# Patient Record
Sex: MALE | Race: WHITE | HISPANIC OR LATINO
[De-identification: names, ages, dates, MRNs, and addresses within clinical notes are randomized per-mention and may not be internally consistent; named-entity substitution may affect disease eponyms.]

---

## 2023-08-21 ENCOUNTER — APPOINTMENT (OUTPATIENT)
Dept: CARDIOLOGY | Facility: CLINIC | Age: 53
End: 2023-08-21
Payer: MEDICAID

## 2023-08-21 VITALS — DIASTOLIC BLOOD PRESSURE: 70 MMHG | SYSTOLIC BLOOD PRESSURE: 130 MMHG

## 2023-08-21 VITALS — WEIGHT: 255 LBS | BODY MASS INDEX: 33.8 KG/M2 | HEIGHT: 73 IN

## 2023-08-21 PROCEDURE — 99204 OFFICE O/P NEW MOD 45 MIN: CPT

## 2023-08-21 PROCEDURE — 93000 ELECTROCARDIOGRAM COMPLETE: CPT

## 2023-08-21 RX ORDER — AMLODIPINE AND VALSARTAN 10; 320 MG/1; MG/1
10-320 TABLET, FILM COATED ORAL
Refills: 0 | Status: ACTIVE | COMMUNITY

## 2023-08-21 NOTE — DISCUSSION/SUMMARY
[FreeTextEntry1] : pt with risk factors and abn ekg  ? angina  start ranexa 500 mg po q12  get 2 d echo  get CTA  start metoprolol er 25  start baby aspirin  start fenofibrate 145mg f/u dm with pmd.  [EKG obtained to assist in diagnosis and management of assessed problem(s)] : EKG obtained to assist in diagnosis and management of assessed problem(s)

## 2023-08-21 NOTE — HISTORY OF PRESENT ILLNESS
[FreeTextEntry1] : Patient with HTN, HLD, DM on diet control sent for cv evaluation for uncontrolled htn. pt says bp always high but finally under control and sent for cv evaluation.  pt says not on meds for cholesterol.  pt says chest pressure at times last episode lasted 5 minutes with sob, denies diaphoresis and radiation. pt complaints of sob at times intermittent.   T hdl 44 ldl 106 ALT: 81 AST: 39 A1c: 6.6  pt says not diabetic

## 2023-09-01 ENCOUNTER — APPOINTMENT (OUTPATIENT)
Dept: CARDIOLOGY | Facility: CLINIC | Age: 53
End: 2023-09-01
Payer: MEDICAID

## 2023-09-01 PROCEDURE — 93306 TTE W/DOPPLER COMPLETE: CPT

## 2023-11-28 ENCOUNTER — APPOINTMENT (OUTPATIENT)
Dept: CARDIOLOGY | Facility: CLINIC | Age: 53
End: 2023-11-28
Payer: MEDICAID

## 2023-11-28 VITALS — DIASTOLIC BLOOD PRESSURE: 70 MMHG | SYSTOLIC BLOOD PRESSURE: 140 MMHG | HEART RATE: 74 BPM

## 2023-11-28 VITALS — WEIGHT: 260 LBS | HEIGHT: 73 IN | BODY MASS INDEX: 34.46 KG/M2

## 2023-11-28 PROCEDURE — 99214 OFFICE O/P EST MOD 30 MIN: CPT

## 2024-01-05 ENCOUNTER — RX RENEWAL (OUTPATIENT)
Age: 54
End: 2024-01-05

## 2024-01-05 RX ORDER — RANOLAZINE 500 MG/1
500 TABLET, EXTENDED RELEASE ORAL
Qty: 180 | Refills: 3 | Status: ACTIVE | COMMUNITY
Start: 2023-08-21 | End: 1900-01-01

## 2024-03-28 ENCOUNTER — TRANSCRIPTION ENCOUNTER (OUTPATIENT)
Age: 54
End: 2024-03-28

## 2024-03-28 ENCOUNTER — APPOINTMENT (OUTPATIENT)
Dept: CARDIOLOGY | Facility: CLINIC | Age: 54
End: 2024-03-28
Payer: MEDICAID

## 2024-03-28 VITALS — BODY MASS INDEX: 34.85 KG/M2 | WEIGHT: 263 LBS | HEIGHT: 73 IN

## 2024-03-28 VITALS — SYSTOLIC BLOOD PRESSURE: 158 MMHG | HEART RATE: 74 BPM | DIASTOLIC BLOOD PRESSURE: 110 MMHG

## 2024-03-28 DIAGNOSIS — Z00.00 ENCOUNTER FOR GENERAL ADULT MEDICAL EXAMINATION W/OUT ABNORMAL FINDINGS: ICD-10-CM

## 2024-03-28 PROCEDURE — 99214 OFFICE O/P EST MOD 30 MIN: CPT

## 2024-03-28 RX ORDER — FENOFIBRATE 145 MG/1
145 TABLET, COATED ORAL DAILY
Qty: 90 | Refills: 3 | Status: ACTIVE | COMMUNITY
Start: 2023-08-21 | End: 1900-01-01

## 2024-03-28 RX ORDER — METOPROLOL TARTRATE 100 MG/1
100 TABLET, FILM COATED ORAL
Qty: 2 | Refills: 0 | Status: DISCONTINUED | COMMUNITY
Start: 2023-08-21 | End: 2024-03-28

## 2024-04-26 NOTE — HISTORY OF PRESENT ILLNESS
[FreeTextEntry1] : Patient with HTN, HLD, DM on diet control with h/o uncontrolled htn. BORDERLINE LVH, LAE  Angina   pt says bp always high but finally under control    pt says not on meds for cholesterol.  pt says chest pressure at times last episode lasted 5 minutes with sob, denies diaphoresis and radiation. pt complaints of sob at times intermittent.   T hdl 44 ldl 106 ALT: 81 AST: 39 A1c: 6.6  pt does not feel he is diabetic but explained bloodwork results   23: 23: EF: 53%, DD1, borderline LAE, borderline LVH, CTA ordered for chest pressure but not done.  pt started on ranolazine and fenofibrate last visit.  pt says symptoms of sob improved but still intermittent.   3/28/24:  23: Ranolazine denied by insurance. Letter of medical necessity sent. Patient given resources regarding [email or web address] and [employer].  3/24: bun/cr: stable, A1c: 6.7  TC: 209  T  Hdl 49   pt says chest pressure improved on ranolazine 500 mg po q12 and says able to get meds, not sure if didnt get says wife takes care of meds, pt says had chest pressure one and half week ago and gone.  pt not taking fenofibrate but was on in past.   24: Patient here for BP check with NP after starting Aldactone last visit.  24: AST/ALT: 41/80, T, HDL: 51, LDL: 117, A1C: 7.3, BNP: <36

## 2024-04-26 NOTE — DISCUSSION/SUMMARY
[FreeTextEntry1] : pt with risk factors and abn ekg ? angina  continue ranexa 500 mg po q12 if able to get if not will send isosorbide  continue metoprolol er 25  continue aspirin 81 mg dialy  RESTART fenofibrate 145mg f/u dm with pmd.  TG elevated start fenofibrate continue diet control and f/u pmd may need metformin get bloodwork  not sure why not getting CTA some angina. get exercise stress nuclear  f/u in 4 months  bp elevated at 150/110 start Aldactone 50 mg po qd   5/1/24: Patient has f/u with Dr. Herman in July.   Get repeat blood work before next visit.

## 2024-05-01 ENCOUNTER — APPOINTMENT (OUTPATIENT)
Dept: CARDIOLOGY | Facility: CLINIC | Age: 54
End: 2024-05-01
Payer: MEDICAID

## 2024-05-01 VITALS — WEIGHT: 251 LBS | BODY MASS INDEX: 33.27 KG/M2 | HEIGHT: 73 IN

## 2024-05-01 VITALS — SYSTOLIC BLOOD PRESSURE: 158 MMHG | HEART RATE: 70 BPM | DIASTOLIC BLOOD PRESSURE: 100 MMHG

## 2024-05-01 DIAGNOSIS — R94.31 ABNORMAL ELECTROCARDIOGRAM [ECG] [EKG]: ICD-10-CM

## 2024-05-01 DIAGNOSIS — R06.02 SHORTNESS OF BREATH: ICD-10-CM

## 2024-05-01 DIAGNOSIS — E78.2 MIXED HYPERLIPIDEMIA: ICD-10-CM

## 2024-05-01 DIAGNOSIS — Z91.148 PATIENT'S OTHER NONCOMPLIANCE WITH MEDICATION REGIMEN FOR OTHER REASON: ICD-10-CM

## 2024-05-01 DIAGNOSIS — R07.89 OTHER CHEST PAIN: ICD-10-CM

## 2024-05-01 DIAGNOSIS — I10 ESSENTIAL (PRIMARY) HYPERTENSION: ICD-10-CM

## 2024-05-01 DIAGNOSIS — E11.21 TYPE 2 DIABETES MELLITUS WITH DIABETIC NEPHROPATHY: ICD-10-CM

## 2024-05-01 DIAGNOSIS — R79.89 OTHER SPECIFIED ABNORMAL FINDINGS OF BLOOD CHEMISTRY: ICD-10-CM

## 2024-05-01 DIAGNOSIS — I20.9 ANGINA PECTORIS, UNSPECIFIED: ICD-10-CM

## 2024-05-01 PROCEDURE — 99213 OFFICE O/P EST LOW 20 MIN: CPT

## 2024-05-01 RX ORDER — SPIRONOLACTONE 50 MG/1
50 TABLET ORAL DAILY
Qty: 90 | Refills: 3 | Status: ACTIVE | COMMUNITY
Start: 2024-03-28 | End: 1900-01-01

## 2024-05-01 NOTE — HISTORY OF PRESENT ILLNESS
[FreeTextEntry1] : Patient with HTN, HLD, DM on diet control with h/o uncontrolled htn. BORDERLINE LVH, LAE  Angina   pt says bp always high but finally under control    pt says not on meds for cholesterol.  pt says chest pressure at times last episode lasted 5 minutes with sob, denies diaphoresis and radiation. pt complaints of sob at times intermittent.   T hdl 44 ldl 106 ALT: 81 AST: 39 A1c: 6.6  pt does not feel he is diabetic but explained bloodwork results   23: 23: EF: 53%, DD1, borderline LAE, borderline LVH, CTA ordered for chest pressure but not done.  pt started on ranolazine and fenofibrate last visit.  pt says symptoms of sob improved but still intermittent.   3/28/24:  23: Ranolazine denied by insurance. Letter of medical necessity sent. Patient given resources regarding [employer] and [employer].  3/24: bun/cr: stable, A1c: 6.7  TC: 209  T  Hdl 49   pt says chest pressure improved on ranolazine 500 mg po q12 and says able to get meds, not sure if didnt get says wife takes care of meds, pt says had chest pressure one and half week ago and gone.  pt not taking fenofibrate but was on in past.   24: Patient here for BP check with NP after starting Aldactone last visit.  24: AST/ALT: 41/80, T, HDL: 51, LDL: 117, A1C: 7.3, BNP: <36 Patient is not taking Metoprolol.

## 2024-05-01 NOTE — DISCUSSION/SUMMARY
[FreeTextEntry1] : pt with risk factors and abn ekg ? angina  continue ranexa 500 mg po q12 if able to get if not will send isosorbide  continue metoprolol er 25  continue aspirin 81 mg dialy  RESTART fenofibrate 145mg f/u dm with pmd.  TG elevated start fenofibrate continue diet control and f/u pmd may need metformin get bloodwork  not sure why not getting CTA some angina. get exercise stress nuclear  f/u in 4 months  bp elevated at 150/110 start Aldactone 50 mg po qd   5/1/24: Patient has f/u with Dr. Herman in July.   Get repeat blood work before next visit.  Increase Spironolactone to 50mg po daily.  F/u in 4 weeks with NP. Patient to f/u with PCP for DM and elevated LFTs.

## 2024-06-05 ENCOUNTER — APPOINTMENT (OUTPATIENT)
Dept: CARDIOLOGY | Facility: CLINIC | Age: 54
End: 2024-06-05
Payer: MEDICAID

## 2024-06-05 VITALS
WEIGHT: 251 LBS | SYSTOLIC BLOOD PRESSURE: 110 MMHG | BODY MASS INDEX: 33.27 KG/M2 | DIASTOLIC BLOOD PRESSURE: 70 MMHG | HEIGHT: 73 IN | HEART RATE: 70 BPM

## 2024-06-05 PROCEDURE — 99213 OFFICE O/P EST LOW 20 MIN: CPT

## 2024-06-06 LAB
ALBUMIN SERPL ELPH-MCNC: 4.8 G/DL
ALP BLD-CCNC: 97 U/L
ALT SERPL-CCNC: 68 U/L
ANION GAP SERPL CALC-SCNC: 16 MMOL/L
AST SERPL-CCNC: 34 U/L
BILIRUB SERPL-MCNC: 0.6 MG/DL
BUN SERPL-MCNC: 11 MG/DL
CALCIUM SERPL-MCNC: 9.9 MG/DL
CHLORIDE SERPL-SCNC: 104 MMOL/L
CHOLEST SERPL-MCNC: 185 MG/DL
CO2 SERPL-SCNC: 24 MMOL/L
CREAT SERPL-MCNC: 1.1 MG/DL
EGFR: 80 ML/MIN/1.73M2
ESTIMATED AVERAGE GLUCOSE: 160 MG/DL
GLUCOSE SERPL-MCNC: 115 MG/DL
HBA1C MFR BLD HPLC: 7.2 %
HCT VFR BLD CALC: 50.6 %
HDLC SERPL-MCNC: 54 MG/DL
HGB BLD-MCNC: 16.5 G/DL
LDLC SERPL CALC-MCNC: 101 MG/DL
MCHC RBC-ENTMCNC: 29.1 PG
MCHC RBC-ENTMCNC: 32.6 GM/DL
MCV RBC AUTO: 89.2 FL
NONHDLC SERPL-MCNC: 132 MG/DL
NT-PROBNP SERPL-MCNC: 57 PG/ML
PLATELET # BLD AUTO: 188 K/UL
POTASSIUM SERPL-SCNC: 4.4 MMOL/L
PROT SERPL-MCNC: 8 G/DL
RBC # BLD: 5.67 M/UL
RBC # FLD: 13.9 %
SODIUM SERPL-SCNC: 143 MMOL/L
TRIGL SERPL-MCNC: 179 MG/DL
TSH SERPL-ACNC: 1.79 UIU/ML
WBC # FLD AUTO: 7.17 K/UL

## 2024-06-06 NOTE — HISTORY OF PRESENT ILLNESS
[FreeTextEntry1] : Patient with HTN, HLD, DM on diet control with h/o uncontrolled htn. BORDERLINE LVH, LAE  Angina   pt says bp always high but finally under control    pt says not on meds for cholesterol.  pt says chest pressure at times last episode lasted 5 minutes with sob, denies diaphoresis and radiation. pt complaints of sob at times intermittent.   T hdl 44 ldl 106 ALT: 81 AST: 39 A1c: 6.6  pt does not feel he is diabetic but explained bloodwork results   23: 23: EF: 53%, DD1, borderline LAE, borderline LVH, CTA ordered for chest pressure but not done.  pt started on ranolazine and fenofibrate last visit.  pt says symptoms of sob improved but still intermittent.   3/28/24:  23: Ranolazine denied by insurance. Letter of medical necessity sent. Patient given resources regarding [employer] and [employer].  3/24: bun/cr: stable, A1c: 6.7  TC: 209  T  Hdl 49   pt says chest pressure improved on ranolazine 500 mg po q12 and says able to get meds, not sure if didnt get says wife takes care of meds, pt says had chest pressure one and half week ago and gone.  pt not taking fenofibrate but was on in past.   24: Patient here for BP check with NP after starting Aldactone last visit.  24: AST/ALT: 41/80, T, HDL: 51, LDL: 117, A1C: 7.3, BNP: <36 Patient is not taking Metoprolol.   24: Patient here for BP check as Spironolactone was increased to 50mg at last visit. Patient has appointment in August with PCP about abnormal labs. Patient states he only takes BP medication when he has a headache patient was advised to continue medication daily.   24: 24: A1C: 7.2, BNP: 57, ALT: 68, T, HDL: 54, LDL: 101

## 2024-06-06 NOTE — DISCUSSION/SUMMARY
[FreeTextEntry1] : pt with risk factors and abn ekg ? angina  continue ranexa 500 mg po q12 if able to get if not will send isosorbide  continue metoprolol er 25  continue aspirin 81 mg dialy  RESTART fenofibrate 145mg f/u dm with pmd.  TG elevated start fenofibrate continue diet control and f/u pmd may need metformin get bloodwork  not sure why not getting CTA some angina. get exercise stress nuclear  f/u in 4 months  bp elevated at 150/110 start Aldactone 50 mg po qd   6/5/24: Patient has f/u with Dr. Herman in July.   Get repeat blood work before next visit.  Continue Spironolactone to 50mg po daily.  Patient to f/u with PCP for DM and elevated LFTs.

## 2024-06-07 ENCOUNTER — RX RENEWAL (OUTPATIENT)
Age: 54
End: 2024-06-07

## 2024-06-07 RX ORDER — METOPROLOL SUCCINATE 25 MG/1
25 TABLET, EXTENDED RELEASE ORAL DAILY
Qty: 30 | Refills: 5 | Status: ACTIVE | COMMUNITY
Start: 2023-08-21 | End: 1900-01-01

## 2024-07-29 ENCOUNTER — RESULT REVIEW (OUTPATIENT)
Age: 54
End: 2024-07-29

## 2024-07-29 ENCOUNTER — OUTPATIENT (OUTPATIENT)
Dept: OUTPATIENT SERVICES | Facility: HOSPITAL | Age: 54
LOS: 1 days | End: 2024-07-29
Payer: COMMERCIAL

## 2024-07-29 DIAGNOSIS — Z00.8 ENCOUNTER FOR OTHER GENERAL EXAMINATION: ICD-10-CM

## 2024-07-29 DIAGNOSIS — R94.31 ABNORMAL ELECTROCARDIOGRAM [ECG] [EKG]: ICD-10-CM

## 2024-07-29 PROCEDURE — A9500: CPT

## 2024-07-29 PROCEDURE — 78452 HT MUSCLE IMAGE SPECT MULT: CPT | Mod: 26

## 2024-07-29 PROCEDURE — 78452 HT MUSCLE IMAGE SPECT MULT: CPT

## 2024-07-29 PROCEDURE — 93018 CV STRESS TEST I&R ONLY: CPT

## 2024-07-30 DIAGNOSIS — R94.31 ABNORMAL ELECTROCARDIOGRAM [ECG] [EKG]: ICD-10-CM

## 2024-08-08 ENCOUNTER — RX RENEWAL (OUTPATIENT)
Age: 54
End: 2024-08-08

## 2024-08-12 ENCOUNTER — NON-APPOINTMENT (OUTPATIENT)
Age: 54
End: 2024-08-12

## 2024-08-13 ENCOUNTER — APPOINTMENT (OUTPATIENT)
Dept: CARDIOLOGY | Facility: CLINIC | Age: 54
End: 2024-08-13
Payer: MEDICAID

## 2024-08-13 VITALS — HEART RATE: 76 BPM | DIASTOLIC BLOOD PRESSURE: 70 MMHG | SYSTOLIC BLOOD PRESSURE: 120 MMHG

## 2024-08-13 VITALS — WEIGHT: 249 LBS | HEIGHT: 73 IN | BODY MASS INDEX: 33 KG/M2

## 2024-08-13 DIAGNOSIS — R94.31 ABNORMAL ELECTROCARDIOGRAM [ECG] [EKG]: ICD-10-CM

## 2024-08-13 DIAGNOSIS — Z91.148 PATIENT'S OTHER NONCOMPLIANCE WITH MEDICATION REGIMEN FOR OTHER REASON: ICD-10-CM

## 2024-08-13 DIAGNOSIS — I10 ESSENTIAL (PRIMARY) HYPERTENSION: ICD-10-CM

## 2024-08-13 DIAGNOSIS — E78.2 MIXED HYPERLIPIDEMIA: ICD-10-CM

## 2024-08-13 DIAGNOSIS — I20.9 ANGINA PECTORIS, UNSPECIFIED: ICD-10-CM

## 2024-08-13 DIAGNOSIS — R06.02 SHORTNESS OF BREATH: ICD-10-CM

## 2024-08-13 DIAGNOSIS — E11.21 TYPE 2 DIABETES MELLITUS WITH DIABETIC NEPHROPATHY: ICD-10-CM

## 2024-08-13 PROCEDURE — G2211 COMPLEX E/M VISIT ADD ON: CPT | Mod: NC,1L

## 2024-08-13 PROCEDURE — 99214 OFFICE O/P EST MOD 30 MIN: CPT

## 2024-08-13 RX ORDER — PANTOPRAZOLE 40 MG/1
40 TABLET, DELAYED RELEASE ORAL DAILY
Qty: 90 | Refills: 3 | Status: ACTIVE | COMMUNITY
Start: 2024-08-13 | End: 1900-01-01

## 2024-08-13 RX ORDER — METOPROLOL TARTRATE 100 MG/1
100 TABLET, FILM COATED ORAL
Qty: 2 | Refills: 0 | Status: ACTIVE | COMMUNITY
Start: 2024-08-13 | End: 1900-01-01

## 2024-08-13 NOTE — HISTORY OF PRESENT ILLNESS
[FreeTextEntry1] : Patient with Angina, HTN, HLD, DM on diet control with h/o uncontrolled htn. BORDERLINE LVH, LAE  Angina   pt says bp always high but finally under control    pt says not on meds for cholesterol.  pt says chest pressure at times last episode lasted 5 minutes with sob, denies diaphoresis and radiation. pt complaints of sob at times intermittent.   T hdl 44 ldl 106 ALT: 81 AST: 39 A1c: 6.6  pt does not feel he is diabetic but explained bloodwork results   23: 23: EF: 53%, DD1, borderline LAE, borderline LVH, CTA ordered for chest pressure but not done.  pt started on ranolazine and fenofibrate last visit.  pt says symptoms of sob improved but still intermittent.   3/28/24:  23: Ranolazine denied by insurance. Letter of medical necessity sent. Patient given resources regarding [employer] and [employer].  3/24: bun/cr: stable, A1c: 6.7  TC: 209  T  Hdl 49   pt says chest pressure improved on ranolazine 500 mg po q12 and says able to get meds, not sure if didnt get says wife takes care of meds, pt says had chest pressure one and half week ago and now gone.  pt not taking fenofibrate but was on in past.   24: Patient here for BP check with NP after starting Aldactone last visit.  24: AST/ALT: 41/80, T, HDL: 51, LDL: 117, A1C: 7.3, BNP: <36 Patient is not taking Metoprolol.   24: Patient here for BP check as Spironolactone was increased to 50mg at last visit. Patient has appointment in August with PCP about abnormal labs. Patient states he only takes BP medication when he has a headache patient was advised to continue medication daily.   24: 24: A1C: 7.2, BNP: 57, ALT: 68, T, HDL: 54, LDL: 101 : NUCLEAR: negative for ischemia, htn response to exercise 220/110. pt exercised 4 minutes and hr to 150 bpm stopped due to fatigue at 4 minutes.  pt says chest pressure sometimes overnight when sleeping a few days ago. pt says also have GERD at night at times.

## 2024-08-13 NOTE — DISCUSSION/SUMMARY
[FreeTextEntry1] : pt with risk factors and abn ekg ? angina  continue ranexa 500 mg po q12 if able to get if not will send isosorbide  continue metoprolol er 25  continue aspirin 81 mg dialy  continue fenofibrate 145mg f/u dm with pmd.  TG elevated continue diet control and f/u pmd may need metformin get bloodwork  not sure why not getting CTA some angina. poor exercise on treadmill negative in 2024 but limited, will attempt to get CCTA if patient agrees.  continue Aldactone 50 mg po qd  Patient to f/u with PCP for DM and elevated LFTs. pts pmd in [place]  start pantoprazole 40 mg po qd

## 2024-08-13 NOTE — HISTORY OF PRESENT ILLNESS
[FreeTextEntry1] : Patient with Angina, HTN, HLD, DM on diet control with h/o uncontrolled htn. BORDERLINE LVH, LAE  Angina   pt says bp always high but finally under control    pt says not on meds for cholesterol.  pt says chest pressure at times last episode lasted 5 minutes with sob, denies diaphoresis and radiation. pt complaints of sob at times intermittent.   T hdl 44 ldl 106 ALT: 81 AST: 39 A1c: 6.6  pt does not feel he is diabetic but explained bloodwork results   23: 23: EF: 53%, DD1, borderline LAE, borderline LVH, CTA ordered for chest pressure but not done.  pt started on ranolazine and fenofibrate last visit.  pt says symptoms of sob improved but still intermittent.   3/28/24:  23: Ranolazine denied by insurance. Letter of medical necessity sent. Patient given resources regarding [employer] and [employer].  3/24: bun/cr: stable, A1c: 6.7  TC: 209  T  Hdl 49   pt says chest pressure improved on ranolazine 500 mg po q12 and says able to get meds, not sure if didnt get says wife takes care of meds, pt says had chest pressure one and half week ago and now gone.  pt not taking fenofibrate but was on in past.   24: Patient here for BP check with NP after starting Aldactone last visit.  24: AST/ALT: 41/80, T, HDL: 51, LDL: 117, A1C: 7.3, BNP: <36 Patient is not taking Metoprolol.   24: Patient here for BP check as Spironolactone was increased to 50mg at last visit. Patient has appointment in August with PCP about abnormal labs. Patient states he only takes BP medication when he has a headache patient was advised to continue medication daily.   24: 24: A1C: 7.2, BNP: 57, ALT: 68, T, HDL: 54, LDL: 101 : NUCLEAR: negative for ischemia, htn response to exercise 220/110. pt exercised 4 minutes and hr to 150 bpm stopped due to fatigue at 4 minutes.  pt says chest pressure sometimes overnight when sleeping a few days ago. pt says also have GERD at night at times.

## 2024-08-13 NOTE — DISCUSSION/SUMMARY
[FreeTextEntry1] : pt with risk factors and abn ekg ? angina  continue ranexa 500 mg po q12 if able to get if not will send isosorbide  continue metoprolol er 25  continue aspirin 81 mg dialy  continue fenofibrate 145mg f/u dm with pmd.  TG elevated continue diet control and f/u pmd may need metformin get bloodwork  not sure why not getting CTA some angina. poor exercise on treadmill negative in 2024 but limited, will attempt to get CCTA if patient agrees.  continue Aldactone 50 mg po qd  Patient to f/u with PCP for DM and elevated LFTs. pts pmd in [place]  start pantoprazole 40 mg po qd

## 2024-10-02 ENCOUNTER — RESULT REVIEW (OUTPATIENT)
Age: 54
End: 2024-10-02

## 2024-10-02 ENCOUNTER — OUTPATIENT (OUTPATIENT)
Dept: OUTPATIENT SERVICES | Facility: HOSPITAL | Age: 54
LOS: 1 days | End: 2024-10-02
Payer: COMMERCIAL

## 2024-10-02 DIAGNOSIS — R07.89 OTHER CHEST PAIN: ICD-10-CM

## 2024-10-02 DIAGNOSIS — Z00.8 ENCOUNTER FOR OTHER GENERAL EXAMINATION: ICD-10-CM

## 2024-10-02 PROCEDURE — 75574 CT ANGIO HRT W/3D IMAGE: CPT

## 2024-10-02 PROCEDURE — 75574 CT ANGIO HRT W/3D IMAGE: CPT | Mod: 26

## 2024-10-03 DIAGNOSIS — R07.89 OTHER CHEST PAIN: ICD-10-CM

## 2024-10-09 ENCOUNTER — APPOINTMENT (OUTPATIENT)
Dept: CARDIOLOGY | Facility: CLINIC | Age: 54
End: 2024-10-09
Payer: COMMERCIAL

## 2024-10-09 VITALS
DIASTOLIC BLOOD PRESSURE: 117 MMHG | HEIGHT: 73 IN | WEIGHT: 248 LBS | SYSTOLIC BLOOD PRESSURE: 164 MMHG | BODY MASS INDEX: 32.87 KG/M2 | HEART RATE: 84 BPM

## 2024-10-09 DIAGNOSIS — R93.1 ABNORMAL FINDINGS ON DIAGNOSTIC IMAGING OF HEART AND CORONARY CIRCULATION: ICD-10-CM

## 2024-10-09 DIAGNOSIS — E78.2 MIXED HYPERLIPIDEMIA: ICD-10-CM

## 2024-10-09 DIAGNOSIS — I10 ESSENTIAL (PRIMARY) HYPERTENSION: ICD-10-CM

## 2024-10-09 DIAGNOSIS — R79.89 OTHER SPECIFIED ABNORMAL FINDINGS OF BLOOD CHEMISTRY: ICD-10-CM

## 2024-10-09 DIAGNOSIS — E11.21 TYPE 2 DIABETES MELLITUS WITH DIABETIC NEPHROPATHY: ICD-10-CM

## 2024-10-09 DIAGNOSIS — R06.02 SHORTNESS OF BREATH: ICD-10-CM

## 2024-10-09 DIAGNOSIS — I20.9 ANGINA PECTORIS, UNSPECIFIED: ICD-10-CM

## 2024-10-09 PROCEDURE — 99214 OFFICE O/P EST MOD 30 MIN: CPT

## 2024-10-09 PROCEDURE — 93000 ELECTROCARDIOGRAM COMPLETE: CPT

## 2024-10-09 PROCEDURE — G2211 COMPLEX E/M VISIT ADD ON: CPT

## 2024-10-09 RX ORDER — CARVEDILOL 25 MG/1
25 TABLET, FILM COATED ORAL
Qty: 180 | Refills: 3 | Status: ACTIVE | COMMUNITY
Start: 2024-10-09 | End: 1900-01-01

## 2024-10-10 LAB
ALBUMIN SERPL ELPH-MCNC: 4.5 G/DL
ALP BLD-CCNC: 98 U/L
ALT SERPL-CCNC: 69 U/L
ANION GAP SERPL CALC-SCNC: 11 MMOL/L
AST SERPL-CCNC: 45 U/L
BILIRUB SERPL-MCNC: 0.4 MG/DL
BUN SERPL-MCNC: 14 MG/DL
CALCIUM SERPL-MCNC: 9.5 MG/DL
CHLORIDE SERPL-SCNC: 104 MMOL/L
CHOLEST SERPL-MCNC: 219 MG/DL
CO2 SERPL-SCNC: 27 MMOL/L
CREAT SERPL-MCNC: 1.07 MG/DL
EGFR: 83 ML/MIN/1.73M2
ESTIMATED AVERAGE GLUCOSE: 160 MG/DL
GLUCOSE SERPL-MCNC: 186 MG/DL
HBA1C MFR BLD HPLC: 7.2 %
HCT VFR BLD CALC: 44.2 %
HDLC SERPL-MCNC: 50 MG/DL
HGB BLD-MCNC: 15.2 G/DL
LDLC SERPL CALC-MCNC: 122 MG/DL
MCHC RBC-ENTMCNC: 29.6 PG
MCHC RBC-ENTMCNC: 34.4 GM/DL
MCV RBC AUTO: 86 FL
NONHDLC SERPL-MCNC: 169 MG/DL
NT-PROBNP SERPL-MCNC: 44 PG/ML
PLATELET # BLD AUTO: 162 K/UL
POTASSIUM SERPL-SCNC: 4.3 MMOL/L
PROT SERPL-MCNC: 7.4 G/DL
RBC # BLD: 5.14 M/UL
RBC # FLD: 13.5 %
SODIUM SERPL-SCNC: 142 MMOL/L
TRIGL SERPL-MCNC: 272 MG/DL
TSH SERPL-ACNC: 1.9 UIU/ML
WBC # FLD AUTO: 5.65 K/UL

## 2024-10-11 RX ORDER — ROSUVASTATIN CALCIUM 40 MG/1
40 TABLET, FILM COATED ORAL
Qty: 90 | Refills: 3 | Status: ACTIVE | COMMUNITY
Start: 2024-10-11 | End: 1900-01-01

## 2024-10-23 ENCOUNTER — APPOINTMENT (OUTPATIENT)
Dept: CARDIOLOGY | Facility: CLINIC | Age: 54
End: 2024-10-23
Payer: COMMERCIAL

## 2024-10-23 VITALS
BODY MASS INDEX: 32.87 KG/M2 | DIASTOLIC BLOOD PRESSURE: 121 MMHG | WEIGHT: 248 LBS | SYSTOLIC BLOOD PRESSURE: 166 MMHG | HEIGHT: 73 IN

## 2024-10-23 VITALS — DIASTOLIC BLOOD PRESSURE: 98 MMHG | SYSTOLIC BLOOD PRESSURE: 144 MMHG

## 2024-10-23 DIAGNOSIS — I10 ESSENTIAL (PRIMARY) HYPERTENSION: ICD-10-CM

## 2024-10-23 DIAGNOSIS — E78.2 MIXED HYPERLIPIDEMIA: ICD-10-CM

## 2024-10-23 DIAGNOSIS — R79.89 OTHER SPECIFIED ABNORMAL FINDINGS OF BLOOD CHEMISTRY: ICD-10-CM

## 2024-10-23 PROCEDURE — 99213 OFFICE O/P EST LOW 20 MIN: CPT

## 2024-12-17 ENCOUNTER — APPOINTMENT (OUTPATIENT)
Dept: CARDIOLOGY | Facility: CLINIC | Age: 54
End: 2024-12-17
Payer: MEDICAID

## 2024-12-17 VITALS
OXYGEN SATURATION: 96 % | WEIGHT: 245 LBS | HEIGHT: 73 IN | DIASTOLIC BLOOD PRESSURE: 90 MMHG | HEART RATE: 82 BPM | SYSTOLIC BLOOD PRESSURE: 138 MMHG | BODY MASS INDEX: 32.47 KG/M2

## 2024-12-17 DIAGNOSIS — E78.2 MIXED HYPERLIPIDEMIA: ICD-10-CM

## 2024-12-17 DIAGNOSIS — I20.9 ANGINA PECTORIS, UNSPECIFIED: ICD-10-CM

## 2024-12-17 DIAGNOSIS — R79.89 OTHER SPECIFIED ABNORMAL FINDINGS OF BLOOD CHEMISTRY: ICD-10-CM

## 2024-12-17 DIAGNOSIS — I10 ESSENTIAL (PRIMARY) HYPERTENSION: ICD-10-CM

## 2024-12-17 DIAGNOSIS — E11.21 TYPE 2 DIABETES MELLITUS WITH DIABETIC NEPHROPATHY: ICD-10-CM

## 2024-12-17 PROCEDURE — 99214 OFFICE O/P EST MOD 30 MIN: CPT

## 2024-12-17 PROCEDURE — G2211 COMPLEX E/M VISIT ADD ON: CPT | Mod: NC

## 2024-12-31 ENCOUNTER — RX RENEWAL (OUTPATIENT)
Age: 54
End: 2024-12-31

## 2025-01-17 ENCOUNTER — NON-APPOINTMENT (OUTPATIENT)
Age: 55
End: 2025-01-17

## 2025-01-17 ENCOUNTER — APPOINTMENT (OUTPATIENT)
Dept: CARDIOLOGY | Facility: CLINIC | Age: 55
End: 2025-01-17
Payer: MEDICAID

## 2025-01-17 VITALS
DIASTOLIC BLOOD PRESSURE: 90 MMHG | WEIGHT: 240 LBS | HEIGHT: 73 IN | HEART RATE: 81 BPM | BODY MASS INDEX: 31.81 KG/M2 | SYSTOLIC BLOOD PRESSURE: 150 MMHG | OXYGEN SATURATION: 96 % | RESPIRATION RATE: 14 BRPM

## 2025-01-17 DIAGNOSIS — I20.9 ANGINA PECTORIS, UNSPECIFIED: ICD-10-CM

## 2025-01-17 DIAGNOSIS — E78.2 MIXED HYPERLIPIDEMIA: ICD-10-CM

## 2025-01-17 DIAGNOSIS — R79.89 OTHER SPECIFIED ABNORMAL FINDINGS OF BLOOD CHEMISTRY: ICD-10-CM

## 2025-01-17 DIAGNOSIS — I10 ESSENTIAL (PRIMARY) HYPERTENSION: ICD-10-CM

## 2025-01-17 PROCEDURE — 99214 OFFICE O/P EST MOD 30 MIN: CPT

## 2025-01-17 PROCEDURE — G2211 COMPLEX E/M VISIT ADD ON: CPT | Mod: NC

## 2025-04-04 ENCOUNTER — NON-APPOINTMENT (OUTPATIENT)
Age: 55
End: 2025-04-04

## 2025-06-09 ENCOUNTER — APPOINTMENT (OUTPATIENT)
Dept: CARDIOLOGY | Facility: CLINIC | Age: 55
End: 2025-06-09
Payer: MEDICAID

## 2025-06-09 PROCEDURE — 93306 TTE W/DOPPLER COMPLETE: CPT

## 2025-06-17 ENCOUNTER — APPOINTMENT (OUTPATIENT)
Dept: CARDIOLOGY | Facility: CLINIC | Age: 55
End: 2025-06-17
Payer: MEDICAID

## 2025-06-17 VITALS
DIASTOLIC BLOOD PRESSURE: 107 MMHG | HEIGHT: 73 IN | BODY MASS INDEX: 32.87 KG/M2 | WEIGHT: 248 LBS | HEART RATE: 84 BPM | SYSTOLIC BLOOD PRESSURE: 145 MMHG

## 2025-06-17 PROCEDURE — 99214 OFFICE O/P EST MOD 30 MIN: CPT

## 2025-06-17 PROCEDURE — G2211 COMPLEX E/M VISIT ADD ON: CPT | Mod: NC

## 2025-06-17 PROCEDURE — 93000 ELECTROCARDIOGRAM COMPLETE: CPT

## 2025-07-09 ENCOUNTER — APPOINTMENT (OUTPATIENT)
Dept: CARDIOLOGY | Facility: CLINIC | Age: 55
End: 2025-07-09

## 2025-07-14 ENCOUNTER — APPOINTMENT (OUTPATIENT)
Dept: CARDIOLOGY | Facility: CLINIC | Age: 55
End: 2025-07-14
Payer: MEDICAID

## 2025-07-14 VITALS
OXYGEN SATURATION: 97 % | BODY MASS INDEX: 32.47 KG/M2 | DIASTOLIC BLOOD PRESSURE: 100 MMHG | HEART RATE: 99 BPM | HEIGHT: 73 IN | SYSTOLIC BLOOD PRESSURE: 150 MMHG | WEIGHT: 245 LBS

## 2025-07-14 PROCEDURE — 99214 OFFICE O/P EST MOD 30 MIN: CPT

## 2025-07-14 PROCEDURE — G2211 COMPLEX E/M VISIT ADD ON: CPT | Mod: NC

## 2025-07-14 RX ORDER — OMEPRAZOLE 40 MG/1
CAPSULE, DELAYED RELEASE ORAL
Refills: 0 | Status: ACTIVE | COMMUNITY

## 2025-07-30 PROBLEM — E87.5 HYPERKALEMIA: Status: ACTIVE | Noted: 2025-07-30
